# Patient Record
Sex: FEMALE | Race: BLACK OR AFRICAN AMERICAN | NOT HISPANIC OR LATINO | Employment: FULL TIME | URBAN - METROPOLITAN AREA
[De-identification: names, ages, dates, MRNs, and addresses within clinical notes are randomized per-mention and may not be internally consistent; named-entity substitution may affect disease eponyms.]

---

## 2019-05-11 PROBLEM — N83.209 OVARIAN CYST: Status: ACTIVE | Noted: 2019-05-11

## 2020-08-12 PROBLEM — Z01.419 ENCOUNTER FOR WELL WOMAN EXAM WITH ROUTINE GYNECOLOGICAL EXAM: Status: ACTIVE | Noted: 2020-08-12

## 2020-08-12 PROBLEM — Z11.3 SCREENING EXAMINATION FOR STD (SEXUALLY TRANSMITTED DISEASE): Status: ACTIVE | Noted: 2020-08-12

## 2021-03-30 PROBLEM — J34.89 CONCHA BULLOSA: Status: ACTIVE | Noted: 2021-03-30

## 2021-03-30 PROBLEM — J34.3 NASAL TURBINATE HYPERTROPHY: Status: ACTIVE | Noted: 2021-03-30

## 2021-04-09 PROBLEM — R09.81 NASAL CONGESTION: Status: ACTIVE | Noted: 2021-04-09

## 2021-05-12 ENCOUNTER — PATIENT MESSAGE (OUTPATIENT)
Dept: RESEARCH | Facility: HOSPITAL | Age: 26
End: 2021-05-12

## 2022-11-28 PROBLEM — R10.30 LOWER ABDOMINAL PAIN: Status: ACTIVE | Noted: 2022-11-28

## 2022-11-28 PROBLEM — R10.2 PELVIC AND PERINEAL PAIN: Status: ACTIVE | Noted: 2022-11-28

## 2023-01-27 PROBLEM — R87.610 ATYPICAL SQUAMOUS CELL CHANGES OF UNDETERMINED SIGNIFICANCE (ASCUS) ON CERVICAL CYTOLOGY WITH NEGATIVE HIGH RISK HUMAN PAPILLOMA VIRUS (HPV) TEST RESULT: Status: ACTIVE | Noted: 2023-01-27

## 2023-05-04 PROBLEM — Z01.818 PREOP EXAMINATION: Status: ACTIVE | Noted: 2023-05-04

## 2023-05-04 PROBLEM — Q52.4 GARTNER'S DUCT CYST: Status: ACTIVE | Noted: 2023-05-04

## 2023-05-10 PROBLEM — N80.9 ENDOMETRIOSIS DETERMINED BY LAPAROSCOPY: Status: ACTIVE | Noted: 2023-05-10

## 2023-05-10 PROBLEM — N94.6 DYSMENORRHEA: Status: ACTIVE | Noted: 2023-05-10

## 2023-05-10 PROBLEM — N89.8 VAGINAL WALL CYST: Status: ACTIVE | Noted: 2023-05-10

## 2023-05-10 PROBLEM — R10.2 PELVIC PAIN: Status: ACTIVE | Noted: 2023-05-10

## 2023-05-10 PROBLEM — Z98.890 STATUS POST LAPAROSCOPY: Status: ACTIVE | Noted: 2023-05-10

## 2023-06-20 ENCOUNTER — PATIENT MESSAGE (OUTPATIENT)
Dept: RESEARCH | Facility: HOSPITAL | Age: 28
End: 2023-06-20
Payer: MEDICAID

## 2023-06-27 ENCOUNTER — PATIENT MESSAGE (OUTPATIENT)
Dept: RESEARCH | Facility: HOSPITAL | Age: 28
End: 2023-06-27
Payer: MEDICAID

## 2023-07-05 ENCOUNTER — PATIENT MESSAGE (OUTPATIENT)
Dept: RESEARCH | Facility: HOSPITAL | Age: 28
End: 2023-07-05
Payer: MEDICAID

## 2023-07-11 ENCOUNTER — PATIENT MESSAGE (OUTPATIENT)
Dept: RESEARCH | Facility: HOSPITAL | Age: 28
End: 2023-07-11
Payer: MEDICAID

## 2023-10-06 ENCOUNTER — ON-DEMAND VIRTUAL (OUTPATIENT)
Dept: URGENT CARE | Facility: CLINIC | Age: 28
End: 2023-10-06
Payer: MEDICAID

## 2023-10-06 DIAGNOSIS — J02.9 ACUTE PHARYNGITIS, UNSPECIFIED ETIOLOGY: Primary | ICD-10-CM

## 2023-10-06 PROCEDURE — 99202 PR OFFICE/OUTPT VISIT, NEW, LEVL II, 15-29 MIN: ICD-10-PCS | Mod: 95,,, | Performed by: NURSE PRACTITIONER

## 2023-10-06 PROCEDURE — 99202 OFFICE O/P NEW SF 15 MIN: CPT | Mod: 95,,, | Performed by: NURSE PRACTITIONER

## 2023-10-06 RX ORDER — AMOXICILLIN 500 MG/1
500 CAPSULE ORAL EVERY 12 HOURS
Qty: 20 CAPSULE | Refills: 0 | Status: SHIPPED | OUTPATIENT
Start: 2023-10-06 | End: 2023-10-16

## 2023-10-06 NOTE — PROGRESS NOTES
Subjective:      Patient ID: Ethan Lares is a 27 y.o. female.    Vitals:  vitals were not taken for this visit.     Chief Complaint: Otalgia, Sinus Problem, and Sore Throat      Visit Type: TELE AUDIOVISUAL    Present with the patient at the time of consultation: TELEMED PRESENT WITH PATIENT: None    Past Medical History:   Diagnosis Date    Anxiety      Past Surgical History:   Procedure Laterality Date    ANTERIOR VAGINAL REPAIR N/A     s/p delivery with vaginal disruption    DIAGNOSTIC LAPAROSCOPY N/A 5/10/2023    Procedure: LAPAROSCOPY, DIAGNOSTIC;  Surgeon: Regla Larios MD;  Location: [facility] MAIN OR;  Service: OB/GYN;  Laterality: N/A;    NASAL TURBINATE REDUCTION Bilateral 4/9/2021    Procedure: REDUCTION, NASAL TURBINATE;  Surgeon: Sujit Thomas MD;  Location: [facility]\"" MAIN OR;  Service: ENT;  Laterality: Bilateral;    VAGINECTOMY N/A 5/10/2023    Procedure: VAGINECTOMY/vaginal cyst excision;  Surgeon: Regla Larios MD;  Location: [facility] MAIN OR;  Service: OB/GYN;  Laterality: N/A;     Review of patient's allergies indicates:   Allergen Reactions    Metronidazole Anxiety, Shortness Of Breath and Swelling    Shrimp Rash     Current Outpatient Medications on File Prior to Visit   Medication Sig Dispense Refill    ISOtretinoin (ACCUTANE) 40 MG capsule Take 1 capsule (40 mg total) by mouth once daily. 30 capsule 0    norgestimate-ethinyl estradioL (ORTHO-CYCLEN) 0.25-35 mg-mcg per tablet Take 1 tablet by mouth once daily. 28 tablet 11    [DISCONTINUED] elagolix (ORILISSA) 150 mg Tab Take 150 mg by mouth Daily. 30 tablet 2    [DISCONTINUED] ibuprofen (ADVIL,MOTRIN) 600 MG tablet Take 1 tablet (600 mg total) by mouth every 8 (eight) hours as needed for Pain. 30 tablet 2    [DISCONTINUED] triamcinolone acetonide 0.1% (KENALOG) 0.1 % cream Apply topically 2 (two) times daily. For two only. (Patient not taking: Reported on 9/18/2023) 28.4 g 1     No current facility-administered  medications on file prior to visit.     Family History   Problem Relation Age of Onset    No Known Problems Mother     No Known Problems Father        Medications Ordered                [id number] - [place], [place] - [address] AT SEC OF [facility] [address] &   [address], [place] [address]    Telephone: [phone]   Fax: [phone]   Hours: Open 24 hours                         E-Prescribed (1 of 1)              amoxicillin (AMOXIL) 500 MG capsule    Sig: Take 1 capsule (500 mg total) by mouth every 12 (twelve) hours. for 10 days       Start: 10/6/23     Quantity: 20 capsule Refills: 0                           Ohs Peq Odvv Intake    10/6/2023  8:53 AM CDT - Filed by Patient   Describe your reason for todays visit Ear ache , sore throat , congestion   What is your current physical address in the event of a medical emergency? [address]   Are you able to take your vital signs? No   Please attach any relevant images or files          Onset 3 days. Reports fever, left otalgia, congestion, sore throat. +sick contacts and strep exposure.    Otalgia   Associated symptoms include a sore throat. Pertinent negatives include no abdominal pain, coughing, diarrhea or vomiting.   Sinus Problem  Associated symptoms include chills, congestion, ear pain and a sore throat. Pertinent negatives include no coughing.   Sore Throat   Associated symptoms include congestion and ear pain. Pertinent negatives include no abdominal pain, coughing, diarrhea or vomiting.       Constitution: Positive for chills and fever (102).   HENT:  Positive for ear pain, congestion and sore throat.    Neck: Negative for painful lymph nodes.   Respiratory:  Negative for cough.    Gastrointestinal:  Negative for abdominal pain, nausea, vomiting and diarrhea.   Hematologic/Lymphatic: Negative for swollen lymph nodes.        Objective:   The physical exam was conducted virtually.  Physical Exam    Constitutional: She is oriented to person, place, and time. She does not appear ill. No distress.   HENT:   Head: Normocephalic and atraumatic.   Nose: Nose normal.   Eyes: Extraocular movement intact   Pulmonary/Chest: Effort normal.   Abdominal: Normal appearance.   Musculoskeletal: Normal range of motion.         General: Normal range of motion.   Neurological: no focal deficit. She is alert and oriented to person, place, and time.   Psychiatric: Her behavior is normal. Mood normal.   Vitals reviewed.      Assessment:     1. Acute pharyngitis, unspecified etiology        Plan:     Patient encouraged to monitor symptoms closely and instructed to follow-up for new or worsening symptoms. Further, in-person, evaluation may be necessary for continued treatment. Please follow up with your primary care doctor or specialist as needed. Verbally discussed plan. Patient confirms understanding and is in agreement with treatment and plan.     You must understand that you've received a Virtual Care evaluation only and that you may be released before all your medical problems are known or treated. You, the patient, will arrange for follow up care as instructed.    Acute pharyngitis, unspecified etiology  -     amoxicillin (AMOXIL) 500 MG capsule; Take 1 capsule (500 mg total) by mouth every 12 (twelve) hours. for 10 days  Dispense: 20 capsule; Refill: 0        Patient Instructions   OVER THE COUNTER RECOMMENDATIONS/SUGGESTIONS (IF NO CONTRAINDICATIONS).     ·         Make sure to stay well hydrated.     ·         Use Nasal Saline to mechanically move any post nasal drip from your eustachian tube or from the back of your throat.     ·         Use warm saltwater gargles to ease your throat pain. Warm saltwater gargles as needed for sore throat-  1/2 tsp salt to 1 cup warm water, gargle as desired. Warm fluids tend to relieve a sore throat.     .         Throat lozenges, Chloraseptic spray or other over the counter treatments  are ok to use as well. Use as directed.     ·         Use an antihistamine such as Claritin, Zyrtec or Allegra to dry you out.     ·         Use pseudoephedrine (behind the counter) to decongest. Pseudoephedrine  30 mg up to 240 mg /day. It can raise your blood pressure and give you palpitations.     ·         Use Mucinex (guaifenesin) to break up mucous up to 2400mg/day to loosen any mucous.     ·         The Mucinex DM pill has a cough suppressant that can be sedating. It can be used at night to stop the tickle at the back of your throat.     ·         You can use Mucinex D (it has guaifenesin and a high dose of pseudoephedrine) in the mornings to help decongest.     ·         Use Afrin (oxymetazoline) in each nare for no longer than 3 days, as it is addictive. It can also dry out your mucous membranes and cause elevated blood pressure. This is especially useful if you are flying.     ·         Use Flonase 1-2 sprays/nostril per day. It is a local acting steroid nasal spray, if you develop a bloody nose, stop using the medication immediately.     ·         Sometimes Nyquil at night is beneficial to help you get some rest, however it is sedating, and it does have an antihistamine, and Tylenol.     ·         Honey is a natural cough suppressant that can be used.     ·         Tylenol up to 4,000 mg a day is safe for short periods and can be used for body aches, pain, and fever. However, in high doses and prolonged use it can cause liver irritation.     ·         Ibuprofen is a non-steroidal anti-inflammatory that can be used for body aches, pain, and fever. However, it can also cause stomach irritation if overused.        Sore Throat Discharge Instructions, Adult   About this topic   Swelling at the back of your throat is called pharyngitis. Swelling of your throat and tonsils is tonsillopharyngitis. Both are commonly called a sore throat. Your sore throat is likely caused by a virus. Most of the time, a sore throat  will go away without antibiotics in a week or two.           What care is needed at home?   Ask your doctor what you need to do when you go home. Make sure you ask questions if you do not understand what the doctor says. This way you will know what you need to do.  To help ease a sore throat you can:  Use a sore throat spray.  Suck on hard candy or throat lozenges.  Gargle with warm saltwater a few times each day. Mix of 1/4 teaspoon (1.25 grams) salt in 8 ounces (240 mL) of warm water.  Use a cool mist humidifier to help you breathe easier.  You may want to take medicine like acetaminophen, ibuprofen, or naproxen for pain.  If you decide to take over-the-counter cough or cold medicines, follow the directions on the label carefully. Be sure you do not take more than one medicine that contains acetaminophen. Also, if you have a heart problem or high blood pressure, check with your doctor before you take any of these medicines.  Wash your hands often. This will help keep others healthy.  What follow-up care is needed?   Your doctor may ask you to make visits to the office to check on your progress. Be sure to keep these visits.  What drugs may be needed?   Take your drugs as ordered by your doctor. Do not skip doses or stop when you feel better. The doctor may order drugs to:  Prevent or fight an infection  Help with pain  Lower fever  Help nasal congestion and runny nose  Soothe the throat  Will physical activity be limited?   You may need to rest at home for 1 to 2 days or until you are feeling well.  What changes to diet are needed?   If your throat feels too sore to eat solid foods you may drink juice, milk, milkshakes, or soups. Talk to your doctor about what diet is proper for your condition.  What can be done to prevent this health problem?   Wash your hands often. Be sure to wash after you blow your nose or take care of others with a sore throat.  Do not share utensils and drinking glasses with someone who has  a sore throat. Wash these objects with hot, soapy water.  Do not share your foods or drinks with others while you are sick. You might infect them.  Throw away used tissues right away and then wash your hands.  Get a new toothbrush after signs are gone or you are done with your antibiotics.  When do I need to call the doctor?   You have trouble breathing.  Your neck, tongue, or throat is swelling.  You are drooling because you cannot swallow your saliva.  You have very bad pain in your throat that keeps you from eating or drinking anything.  There are large, painful lumps in your neck.  You have blisters in the back of your throat.  Teach Back: Helping You Understand   The Teach Back Method helps you understand the information we are giving you. After you talk with the staff, tell them in your own words what you learned. This helps to make sure the staff has described each thing clearly. It also helps to explain things that may have been confusing. Before going home, make sure you can do these:  I can tell you about my condition.  I can tell you what may help ease my pain.  I can tell you what I will do if I have trouble breathing or swallowing or have large painful lumps in my throat.  Where can I learn more?   Centers for Disease Control and Prevention  https://www.cdc.gov/antibiotic-use/community/for-patients/common-illnesses/sore-throat.html   [facility]  https://www.health.govt.nz/your-health/conditions-and-treatments/diseases-and-illnesses/sore-throat   NHS Choices  https://www.nhs.uk/conditions/sore-throat/   Last Reviewed Date   2021-06-08  Consumer Information Use and Disclaimer   This information is not specific medical advice and does not replace information you receive from your health care provider. This is only a brief summary of general information. It does NOT include all information about conditions, illnesses, injuries, tests, procedures, treatments, therapies, discharge instructions or  life-style choices that may apply to you. You must talk with your health care provider for complete information about your health and treatment options. This information should not be used to decide whether or not to accept your health care providers advice, instructions or recommendations. Only your health care provider has the knowledge and training to provide advice that is right for you.  Copyright   Copyright © 2021 [employer] and its affiliates and/or licensors. All rights reserved.

## 2023-10-06 NOTE — PATIENT INSTRUCTIONS
OVER THE COUNTER RECOMMENDATIONS/SUGGESTIONS (IF NO CONTRAINDICATIONS).     ·         Make sure to stay well hydrated.     ·         Use Nasal Saline to mechanically move any post nasal drip from your eustachian tube or from the back of your throat.     ·         Use warm saltwater gargles to ease your throat pain. Warm saltwater gargles as needed for sore throat-  1/2 tsp salt to 1 cup warm water, gargle as desired. Warm fluids tend to relieve a sore throat.     .         Throat lozenges, Chloraseptic spray or other over the counter treatments are ok to use as well. Use as directed.     ·         Use an antihistamine such as Claritin, Zyrtec or Allegra to dry you out.     ·         Use pseudoephedrine (behind the counter) to decongest. Pseudoephedrine  30 mg up to 240 mg /day. It can raise your blood pressure and give you palpitations.     ·         Use Mucinex (guaifenesin) to break up mucous up to 2400mg/day to loosen any mucous.     ·         The Mucinex DM pill has a cough suppressant that can be sedating. It can be used at night to stop the tickle at the back of your throat.     ·         You can use Mucinex D (it has guaifenesin and a high dose of pseudoephedrine) in the mornings to help decongest.     ·         Use Afrin (oxymetazoline) in each nare for no longer than 3 days, as it is addictive. It can also dry out your mucous membranes and cause elevated blood pressure. This is especially useful if you are flying.     ·         Use Flonase 1-2 sprays/nostril per day. It is a local acting steroid nasal spray, if you develop a bloody nose, stop using the medication immediately.     ·         Sometimes Nyquil at night is beneficial to help you get some rest, however it is sedating, and it does have an antihistamine, and Tylenol.     ·         Honey is a natural cough suppressant that can be used.     ·         Tylenol up to 4,000 mg a day is safe for short periods and can be used for body aches, pain, and  fever. However, in high doses and prolonged use it can cause liver irritation.     ·         Ibuprofen is a non-steroidal anti-inflammatory that can be used for body aches, pain, and fever. However, it can also cause stomach irritation if overused.        Sore Throat Discharge Instructions, Adult   About this topic   Swelling at the back of your throat is called pharyngitis. Swelling of your throat and tonsils is tonsillopharyngitis. Both are commonly called a sore throat. Your sore throat is likely caused by a virus. Most of the time, a sore throat will go away without antibiotics in a week or two.           What care is needed at home?   Ask your doctor what you need to do when you go home. Make sure you ask questions if you do not understand what the doctor says. This way you will know what you need to do.  To help ease a sore throat you can:  Use a sore throat spray.  Suck on hard candy or throat lozenges.  Gargle with warm saltwater a few times each day. Mix of 1/4 teaspoon (1.25 grams) salt in 8 ounces (240 mL) of warm water.  Use a cool mist humidifier to help you breathe easier.  You may want to take medicine like acetaminophen, ibuprofen, or naproxen for pain.  If you decide to take over-the-counter cough or cold medicines, follow the directions on the label carefully. Be sure you do not take more than one medicine that contains acetaminophen. Also, if you have a heart problem or high blood pressure, check with your doctor before you take any of these medicines.  Wash your hands often. This will help keep others healthy.  What follow-up care is needed?   Your doctor may ask you to make visits to the office to check on your progress. Be sure to keep these visits.  What drugs may be needed?   Take your drugs as ordered by your doctor. Do not skip doses or stop when you feel better. The doctor may order drugs to:  Prevent or fight an infection  Help with pain  Lower fever  Help nasal congestion and runny  nose  Soothe the throat  Will physical activity be limited?   You may need to rest at home for 1 to 2 days or until you are feeling well.  What changes to diet are needed?   If your throat feels too sore to eat solid foods you may drink juice, milk, milkshakes, or soups. Talk to your doctor about what diet is proper for your condition.  What can be done to prevent this health problem?   Wash your hands often. Be sure to wash after you blow your nose or take care of others with a sore throat.  Do not share utensils and drinking glasses with someone who has a sore throat. Wash these objects with hot, soapy water.  Do not share your foods or drinks with others while you are sick. You might infect them.  Throw away used tissues right away and then wash your hands.  Get a new toothbrush after signs are gone or you are done with your antibiotics.  When do I need to call the doctor?   You have trouble breathing.  Your neck, tongue, or throat is swelling.  You are drooling because you cannot swallow your saliva.  You have very bad pain in your throat that keeps you from eating or drinking anything.  There are large, painful lumps in your neck.  You have blisters in the back of your throat.  Teach Back: Helping You Understand   The Teach Back Method helps you understand the information we are giving you. After you talk with the staff, tell them in your own words what you learned. This helps to make sure the staff has described each thing clearly. It also helps to explain things that may have been confusing. Before going home, make sure you can do these:  I can tell you about my condition.  I can tell you what may help ease my pain.  I can tell you what I will do if I have trouble breathing or swallowing or have large painful lumps in my throat.  Where can I learn more?   Centers for Disease Control and Prevention  https://www.cdc.gov/antibiotic-use/community/for-patients/common-illnesses/sore-throat.html   [facility]  Health  https://www.health.govt.nz/your-health/conditions-and-treatments/diseases-and-illnesses/sore-throat   NHS Choices  https://www.nhs.uk/conditions/sore-throat/   Last Reviewed Date   2021-06-08  Consumer Information Use and Disclaimer   This information is not specific medical advice and does not replace information you receive from your health care provider. This is only a brief summary of general information. It does NOT include all information about conditions, illnesses, injuries, tests, procedures, treatments, therapies, discharge instructions or life-style choices that may apply to you. You must talk with your health care provider for complete information about your health and treatment options. This information should not be used to decide whether or not to accept your health care providers advice, instructions or recommendations. Only your health care provider has the knowledge and training to provide advice that is right for you.  Copyright   Copyright © 2021 [employer] and its affiliates and/or licensors. All rights reserved.

## 2023-10-31 ENCOUNTER — ON-DEMAND VIRTUAL (OUTPATIENT)
Dept: URGENT CARE | Facility: CLINIC | Age: 28
End: 2023-10-31
Payer: MEDICAID

## 2023-10-31 DIAGNOSIS — B37.31 YEAST VAGINITIS: Primary | ICD-10-CM

## 2023-10-31 PROCEDURE — 99213 OFFICE O/P EST LOW 20 MIN: CPT | Mod: 95,,, | Performed by: FAMILY MEDICINE

## 2023-10-31 PROCEDURE — 99213 PR OFFICE/OUTPT VISIT, EST, LEVL III, 20-29 MIN: ICD-10-PCS | Mod: 95,,, | Performed by: FAMILY MEDICINE

## 2023-10-31 RX ORDER — FLUCONAZOLE 150 MG/1
TABLET ORAL
Qty: 2 TABLET | Refills: 0 | Status: SHIPPED | OUTPATIENT
Start: 2023-10-31 | End: 2023-12-21

## 2023-10-31 NOTE — PROGRESS NOTES
Subjective:      Patient ID: Ethan Lares is a 27 y.o. female.    Vitals:  vitals were not taken for this visit.     Chief Complaint: Vaginitis      Visit Type: TELE AUDIOVISUAL    Present with the patient at the time of consultation: TELEMED PRESENT WITH PATIENT: None    Past Medical History:   Diagnosis Date    Anxiety      Past Surgical History:   Procedure Laterality Date    ANTERIOR VAGINAL REPAIR N/A     s/p delivery with vaginal disruption    DIAGNOSTIC LAPAROSCOPY N/A 5/10/2023    Procedure: LAPAROSCOPY, DIAGNOSTIC;  Surgeon: Regla Larios MD;  Location: [facility] MAIN OR;  Service: OB/GYN;  Laterality: N/A;    NASAL TURBINATE REDUCTION Bilateral 4/9/2021    Procedure: REDUCTION, NASAL TURBINATE;  Surgeon: Sujit Thomas MD;  Location: [facility] MAIN OR;  Service: ENT;  Laterality: Bilateral;    VAGINECTOMY N/A 5/10/2023    Procedure: VAGINECTOMY/vaginal cyst excision;  Surgeon: Regla Larios MD;  Location: [facility] MAIN OR;  Service: OB/GYN;  Laterality: N/A;     Review of patient's allergies indicates:   Allergen Reactions    Metronidazole Anxiety, Shortness Of Breath and Swelling    Shrimp Rash     Current Outpatient Medications on File Prior to Visit   Medication Sig Dispense Refill    ISOtretinoin (ACCUTANE) 40 MG capsule Take 1 capsule (40 mg total) by mouth once daily. 30 capsule 0    norgestimate-ethinyl estradioL (ORTHO-CYCLEN) 0.25-35 mg-mcg per tablet Take 1 tablet by mouth once daily. 28 tablet 11     No current facility-administered medications on file prior to visit.     Family History   Problem Relation Age of Onset    No Known Problems Mother     No Known Problems Father        Medications Ordered                [id number] [place] [place] [facility] [address] AT [facility]   [address], [place] [address]    Telephone: [phone]   Fax: [phone]   Hours: Not open 24 hours                          E-Prescribed (1 of 1)              fluconazole (DIFLUCAN) 150 MG Tab    Sig: One by mouth as a single dose.  Repeat in 72 hours if still with symptoms.       Start: 10/31/23     Quantity: 2 tablet Refills: 0                           Ohs Peq Odvv Intake    10/31/2023  4:00 AM CDT - Filed by Patient   Describe your reason for todays visit Yeast infection   What is your current physical address in the event of a medical emergency? [address]   Are you able to take your vital signs? No   Please attach any relevant images or files          27-year-old female with 3rd day of what she thinks is a yeast infection.  She reports thick whitish discharge and vaginal itching.  No malodor.  No pelvic pain.  No fever.  She denies concern for STD.  Monogamous for 1 year.        Constitution: Negative for fever.   Genitourinary:  Positive for vaginal discharge. Negative for pelvic pain.        Objective:   The physical exam was conducted virtually.  Physical Exam   Constitutional: She is oriented to person, place, and time. She appears well-developed.  Non-toxic appearance. She does not appear ill. No distress.   HENT:   Head: Normocephalic and atraumatic.   Ears:   Right Ear: External ear normal.   Left Ear: External ear normal.   Pulmonary/Chest: Effort normal. No stridor. No respiratory distress.   Abdominal: There is no abdominal tenderness.   Neurological: She is alert and oriented to person, place, and time.   Skin: Skin is not diaphoretic.   Psychiatric: Her behavior is normal. Thought content normal.   Nursing note and vitals reviewed.      Assessment:     1. Yeast vaginitis        Plan:       Yeast vaginitis  -     fluconazole (DIFLUCAN) 150 MG Tab; One by mouth as a single dose.  Repeat in 72 hours if still with symptoms.  Dispense: 2 tablet; Refill: 0    As we discussed, if this does not improve/resolve you will need to be seen in person.  Or in the event of pelvic pain, fever etc..    Make sure that you  follow up with your primary care doctor in the next 2-5 days if needed .  Go to or return to Urgent Care if signs or symptoms change and certainly if you have worsening and/or severe symptoms go to the nearest emergency department for further evaluation.

## 2023-10-31 NOTE — PATIENT INSTRUCTIONS
As we discussed, if this does not improve/resolve you will need to be seen in person.  Or in the event of pelvic pain, fever etc..    Make sure that you follow up with your primary care doctor in the next 2-5 days if needed .  Go to or return to Urgent Care if signs or symptoms change and certainly if you have worsening and/or severe symptoms go to the nearest emergency department for further evaluation.

## 2023-12-21 ENCOUNTER — ON-DEMAND VIRTUAL (OUTPATIENT)
Dept: URGENT CARE | Facility: CLINIC | Age: 28
End: 2023-12-21
Payer: MEDICAID

## 2023-12-21 DIAGNOSIS — N89.8 VAGINAL DISCHARGE: Primary | ICD-10-CM

## 2023-12-21 PROCEDURE — 99212 PR OFFICE/OUTPT VISIT, EST, LEVL II, 10-19 MIN: ICD-10-PCS | Mod: 95,,, | Performed by: NURSE PRACTITIONER

## 2023-12-21 PROCEDURE — 99212 OFFICE O/P EST SF 10 MIN: CPT | Mod: 95,,, | Performed by: NURSE PRACTITIONER

## 2023-12-21 RX ORDER — FLUCONAZOLE 150 MG/1
150 TABLET ORAL DAILY
Qty: 2 TABLET | Refills: 0 | Status: SHIPPED | OUTPATIENT
Start: 2023-12-21 | End: 2023-12-23

## 2023-12-21 RX ORDER — CLINDAMYCIN HYDROCHLORIDE 300 MG/1
300 CAPSULE ORAL EVERY 12 HOURS
Qty: 14 CAPSULE | Refills: 0 | Status: SHIPPED | OUTPATIENT
Start: 2023-12-21 | End: 2023-12-28

## 2023-12-21 NOTE — PROGRESS NOTES
Subjective:      Patient ID: Ethan Lares is a 28 y.o. female.    Vitals:  vitals were not taken for this visit.     Chief Complaint: Vaginal Discharge      Visit Type: TELE AUDIOVISUAL    Present with the patient at the time of consultation: TELEMED PRESENT WITH PATIENT: None    Past Medical History:   Diagnosis Date    Anxiety      Past Surgical History:   Procedure Laterality Date    ANTERIOR VAGINAL REPAIR N/A     s/p delivery with vaginal disruption    DIAGNOSTIC LAPAROSCOPY N/A 5/10/2023    Procedure: LAPAROSCOPY, DIAGNOSTIC;  Surgeon: Regla Larios MD;  Location: [facility] MAIN OR;  Service: OB/GYN;  Laterality: N/A;    NASAL TURBINATE REDUCTION Bilateral 4/9/2021    Procedure: REDUCTION, NASAL TURBINATE;  Surgeon: Sujit Thomas MD;  Location: [facility] MAIN OR;  Service: ENT;  Laterality: Bilateral;    VAGINECTOMY N/A 5/10/2023    Procedure: VAGINECTOMY/vaginal cyst excision;  Surgeon: Regla Larios MD;  Location: [facility] MAIN OR;  Service: OB/GYN;  Laterality: N/A;     Review of patient's allergies indicates:   Allergen Reactions    Metronidazole Anxiety, Shortness Of Breath and Swelling    Shrimp Rash     Current Outpatient Medications on File Prior to Visit   Medication Sig Dispense Refill    ISOtretinoin (ACCUTANE) 40 MG capsule Take 1 capsule (40 mg total) by mouth once daily. 30 capsule 0    norgestimate-ethinyl estradioL (ORTHO-CYCLEN) 0.25-35 mg-mcg per tablet Take 1 tablet by mouth once daily. 28 tablet 11    [DISCONTINUED] fluconazole (DIFLUCAN) 150 MG Tab One by mouth as a single dose.  Repeat in 72 hours if still with symptoms. 2 tablet 0     No current facility-administered medications on file prior to visit.     Family History   Problem Relation Age of Onset    No Known Problems Mother     No Known Problems Father        Medications Ordered                [facility] #[id number] - [address] AT [place] [address]  [place] [address]    Telephone: [phone]   Fax: [phone]   Hours: Not open 24 hours                         E-Prescribed (2 of 2)              clindamycin (CLEOCIN) 300 MG capsule    Sig: Take 1 capsule (300 mg total) by mouth every 12 (twelve) hours. for 7 days       Start: 12/21/23     Quantity: 14 capsule Refills: 0                         fluconazole (DIFLUCAN) 150 MG Tab    Sig: Take 1 tablet (150 mg total) by mouth once daily. Take 1 tablet as a single dose. If symptoms persist, may repeat a second dose in 72 hours. for 2 days       Start: 12/21/23     Quantity: 2 tablet Refills: 0                           Ohs Peq Odvv Intake    12/21/2023  9:07 AM CST - Filed by Patient   Describe your reason for todays visit BV and yeast symptoms   What is your current physical address in the event of a medical emergency? [address]   Are you able to take your vital signs? No   Please attach any relevant images or files          Hx of BV and yeast infections. Reports vaginal discharge and odor. No dysuria. No other associated symptoms to report. Allergy to flagyl, prefers oral clindamycin for tx.    Vaginal Discharge  The patient's primary symptoms include vaginal discharge. The patient's pertinent negatives include no pelvic pain. Pertinent negatives include no abdominal pain, back pain, chills, dysuria, fever or flank pain.       Constitution: Negative for chills and fever.   Gastrointestinal:  Negative for abdominal pain.   Genitourinary:  Positive for vaginal discharge and vaginal odor. Negative for dysuria, flank pain and pelvic pain.   Musculoskeletal:  Negative for back pain.        Objective:   The physical exam was conducted virtually.  Physical Exam   Constitutional: She is oriented to person, place, and time. She does not appear ill. No distress.   HENT:   Head: Normocephalic and atraumatic.   Nose: Nose normal.   Eyes: Extraocular movement intact   Pulmonary/Chest:  Effort normal.   Abdominal: Normal appearance.   Musculoskeletal: Normal range of motion.         General: Normal range of motion.   Neurological: no focal deficit. She is alert and oriented to person, place, and time.   Psychiatric: Her behavior is normal. Mood normal.   Vitals reviewed.      Assessment:     1. Vaginal discharge        Plan:   Patient encouraged to monitor symptoms closely and instructed to follow-up for new or worsening symptoms. Further, in-person, evaluation may be necessary for continued treatment. Please follow up with your primary care doctor or specialist as needed. Verbally discussed plan. Patient confirms understanding and is in agreement with treatment and plan.     You must understand that you've received a Virtual Care evaluation only and that you may be released before all your medical problems are known or treated. You, the patient, will arrange for follow up care as instructed.      Vaginal discharge  -     clindamycin (CLEOCIN) 300 MG capsule; Take 1 capsule (300 mg total) by mouth every 12 (twelve) hours. for 7 days  Dispense: 14 capsule; Refill: 0  -     fluconazole (DIFLUCAN) 150 MG Tab; Take 1 tablet (150 mg total) by mouth once daily. Take 1 tablet as a single dose. If symptoms persist, may repeat a second dose in 72 hours. for 2 days  Dispense: 2 tablet; Refill: 0

## 2024-01-22 ENCOUNTER — ON-DEMAND VIRTUAL (OUTPATIENT)
Dept: URGENT CARE | Facility: CLINIC | Age: 29
End: 2024-01-22
Payer: MEDICAID

## 2024-01-22 DIAGNOSIS — N76.0 ACUTE VAGINITIS: Primary | ICD-10-CM

## 2024-01-22 PROCEDURE — 99213 OFFICE O/P EST LOW 20 MIN: CPT | Mod: 95,,, | Performed by: PHYSICIAN ASSISTANT

## 2024-01-22 RX ORDER — CLINDAMYCIN HYDROCHLORIDE 300 MG/1
300 CAPSULE ORAL 2 TIMES DAILY
Qty: 14 CAPSULE | Refills: 0 | Status: SHIPPED | OUTPATIENT
Start: 2024-01-22 | End: 2024-01-29

## 2024-01-23 NOTE — PATIENT INSTRUCTIONS
1. Prescription has been sent to your pharmacy, please take as directed.   2. Wear loose fitting clothes and avoid touching/itching area. Keep area clean and dry.   3. Recheck in 4-5 days at local urgent care if no improvement sooner if worsening pain, fevers, vomiting. Keep appointment with OBGYN to discuss recurrence of these infections.   4. You must understand that you've received a Telehealth Urgent Care treatment only and that you may be released before all your medical problems are known or treated. You, the patient, will arrange for follow up care as instructed.

## 2024-01-23 NOTE — PROGRESS NOTES
Subjective:      Patient ID: Ethan Lares is a 28 y.o. female.    Vitals:  vitals were not taken for this visit.     Chief Complaint: Female  Problem      Visit Type: TELE AUDIOVISUAL    Present with the patient at the time of consultation: TELEMED PRESENT WITH PATIENT: None    Past Medical History:   Diagnosis Date    Anxiety      Past Surgical History:   Procedure Laterality Date    ANTERIOR VAGINAL REPAIR N/A     s/p delivery with vaginal disruption    DIAGNOSTIC LAPAROSCOPY N/A 5/10/2023    Procedure: LAPAROSCOPY, DIAGNOSTIC;  Surgeon: Regla Larios MD;  Location: [facility] MAIN OR;  Service: OB/GYN;  Laterality: N/A;    NASAL TURBINATE REDUCTION Bilateral 4/9/2021    Procedure: REDUCTION, NASAL TURBINATE;  Surgeon: Sujit Thomas MD;  [facility];  Service: ENT;  Laterality: Bilateral;    VAGINECTOMY N/A 5/10/2023    Procedure: VAGINECTOMY/vaginal cyst excision;  Surgeon: Regla Larios MD;  Location: [facility] MAIN OR;  Service: OB/GYN;  Laterality: N/A;     Review of patient's allergies indicates:   Allergen Reactions    Metronidazole Anxiety, Shortness Of Breath and Swelling    Shrimp Rash     Current Outpatient Medications on File Prior to Visit   Medication Sig Dispense Refill    ISOtretinoin (ACCUTANE) 40 MG capsule Take 1 capsule (40 mg total) by mouth once daily. 30 capsule 0    norgestimate-ethinyl estradioL (ORTHO-CYCLEN) 0.25-35 mg-mcg per tablet Take 1 tablet by mouth once daily. 28 tablet 11     No current facility-administered medications on file prior to visit.     Family History   Problem Relation Age of Onset    No Known Problems Mother     No Known Problems Father        Medications Ordered                [id number] [place] [place] [facility] [address] AT [facility]   [address], [place] [address]    Telephone: [phone]   Fax: [phone]   Hours: Not open 24 hours                          E-Prescribed (1 of 1)              clindamycin (CLEOCIN) 300 MG capsule    Sig: Take 1 capsule (300 mg total) by mouth 2 (two) times daily. for 7 days       Start: 1/22/24     Quantity: 14 capsule Refills: 0                           Ohs Peq Odvv Intake    1/22/2024  8:27 PM CST - Filed by Patient   What is your current physical address in the event of a medical emergency? [address]   Are you able to take your vital signs? No   Please attach any relevant images or files          HPI  27yo female presents with c/o vaginal fishy order x two days ago. Denies fevers, chills, n/v, pelvic pain, vaginal discharge, urinary symptoms.     Hx of BV, recurrent every few months. Also has endometriosis. Unable to see OBGYN until next week.    Had annual with OBGYN last week, all normal then. Denies POP, STD risk.          Constitution: Negative for chills and fever.   Gastrointestinal:  Negative for abdominal pain, nausea and vomiting.   Genitourinary:  Positive for vaginal odor. Negative for dysuria, frequency, urgency, hematuria, vaginal discharge, vaginal bleeding, genital sore and pelvic pain.        Objective:   The physical exam was conducted virtually.  Physical Exam   Constitutional: She is oriented to person, place, and time.  Non-toxic appearance. She does not appear ill. No distress.   HENT:   Head: Normocephalic and atraumatic.   Pulmonary/Chest: Effort normal. No respiratory distress. She has no wheezes.   Abdominal: Normal appearance. She exhibits no distension. Soft. There is no abdominal tenderness.   Neurological: She is alert and oriented to person, place, and time. Coordination normal.   Skin: Skin is not diaphoretic, not pale and no rash.   Psychiatric: Her behavior is normal. Judgment and thought content normal.       Assessment:     1. Acute vaginitis        Plan:       Acute vaginitis    Other orders  -     clindamycin (CLEOCIN) 300 MG capsule; Take 1 capsule (300 mg total) by mouth 2 (two)  times daily. for 7 days  Dispense: 14 capsule; Refill: 0    1. Prescription has been sent to your pharmacy, please take as directed.   2. Wear loose fitting clothes and avoid touching/itching area. Keep area clean and dry.   3. Recheck in 4-5 days at local urgent care if no improvement sooner if worsening pain, fevers, vomiting. Keep appointment with OBGYN to discuss recurrence of these infections.   4. You must understand that you've received a Telehealth Urgent Care treatment only and that you may be released before all your medical problems are known or treated. You, the patient, will arrange for follow up care as instructed.     Verbally discussed plan and patient confirms understanding.

## 2024-02-21 ENCOUNTER — ON-DEMAND VIRTUAL (OUTPATIENT)
Dept: URGENT CARE | Facility: CLINIC | Age: 29
End: 2024-02-21
Payer: MEDICAID

## 2024-02-21 DIAGNOSIS — N89.8 VAGINAL DISCHARGE: Primary | ICD-10-CM

## 2024-02-21 PROCEDURE — 99212 OFFICE O/P EST SF 10 MIN: CPT | Mod: 95,,, | Performed by: NURSE PRACTITIONER

## 2024-02-21 RX ORDER — CLINDAMYCIN HYDROCHLORIDE 300 MG/1
300 CAPSULE ORAL EVERY 12 HOURS
Qty: 14 CAPSULE | Refills: 0 | Status: SHIPPED | OUTPATIENT
Start: 2024-02-21 | End: 2024-02-28

## 2024-02-21 NOTE — PROGRESS NOTES
Subjective:      Patient ID: Ethan Lares is a 28 y.o. female.    Vitals:  vitals were not taken for this visit.     Chief Complaint: Vaginal Discharge      Visit Type: TELE AUDIOVISUAL    Present with the patient at the time of consultation: TELEMED PRESENT WITH PATIENT: None    Past Medical History:   Diagnosis Date    Anxiety      Past Surgical History:   Procedure Laterality Date    ANTERIOR VAGINAL REPAIR N/A     s/p delivery with vaginal disruption    DIAGNOSTIC LAPAROSCOPY N/A 5/10/2023    Procedure: LAPAROSCOPY, DIAGNOSTIC;  Surgeon: Regla Larios MD;  Location: [facility] MAIN OR;  Service: OB/GYN;  Laterality: N/A;    NASAL TURBINATE REDUCTION Bilateral 4/9/2021    Procedure: REDUCTION, NASAL TURBINATE;  Surgeon: Sujit Thomas MD;  Location: [facility] OR;  Service: ENT;  Laterality: Bilateral;    VAGINECTOMY N/A 5/10/2023    Procedure: VAGINECTOMY/vaginal cyst excision;  Surgeon: Regla Larios MD;  Location: [facility] MAIN OR;  Service: OB/GYN;  Laterality: N/A;     Review of patient's allergies indicates:   Allergen Reactions    Metronidazole Anxiety, Shortness Of Breath and Swelling    Shrimp Rash     Current Outpatient Medications on File Prior to Visit   Medication Sig Dispense Refill    [DISCONTINUED] ISOtretinoin (ACCUTANE) 40 MG capsule Take 1 capsule (40 mg total) by mouth once daily. 30 capsule 0     No current facility-administered medications on file prior to visit.     Family History   Problem Relation Age of Onset    No Known Problems Mother     No Known Problems Father        Medications Ordered                [facility] #[id number] [address] AT [facility]   [address] [facility] [address]    Telephone: [phone]   Fax: [phone]   Hours: Not open 24 hours                         E-Prescribed (1 of 1)              clindamycin (CLEOCIN) 300 MG capsule    Sig: Take 1 capsule (300 mg total)  by mouth every 12 (twelve) hours. for 7 days       Start: 2/21/24     Quantity: 14 capsule Refills: 0                           Ohs Peq Odvv Intake    2/21/2024 10:56 AM CST - Filed by Patient   What is your current physical address in the event of a medical emergency? [address] [facility] [address]   Are you able to take your vital signs? No   Please attach any relevant images or files          Hx of bacterial vaginosis. Fishy odor and discharge. No itching, rash or dysuria. Seeking Clindamycin for treatment.        Genitourinary:  Positive for vaginal discharge and vaginal odor. Negative for dysuria, hematuria, vaginal pain and pelvic pain.   Skin:  Negative for rash.   Allergic/Immunologic: Negative for itching.        Objective:   The physical exam was conducted virtually.  Physical Exam   Constitutional: She is oriented to person, place, and time. She does not appear ill. No distress.   HENT:   Head: Normocephalic and atraumatic.   Nose: Nose normal.   Eyes: Extraocular movement intact   Pulmonary/Chest: Effort normal.   Abdominal: Normal appearance.   Musculoskeletal: Normal range of motion.         General: Normal range of motion.   Neurological: no focal deficit. She is alert and oriented to person, place, and time.   Psychiatric: Her behavior is normal. Mood normal.   Vitals reviewed.      Assessment:     1. Vaginal discharge        Plan:   Patient encouraged to monitor symptoms closely and instructed to follow-up for new or worsening symptoms. Further, in-person, evaluation may be necessary for continued treatment. Please follow up with your primary care doctor or specialist as needed. Verbally discussed plan. Patient confirms understanding and is in agreement with treatment and plan.     You must understand that you've received a Virtual Care evaluation only and that you may be released before all your medical problems are known or treated. You, the patient, will arrange for follow up care as instructed.       Vaginal discharge  -     clindamycin (CLEOCIN) 300 MG capsule; Take 1 capsule (300 mg total) by mouth every 12 (twelve) hours. for 7 days  Dispense: 14 capsule; Refill: 0

## 2024-03-21 ENCOUNTER — ON-DEMAND VIRTUAL (OUTPATIENT)
Dept: URGENT CARE | Facility: CLINIC | Age: 29
End: 2024-03-21
Payer: MEDICAID

## 2024-03-21 DIAGNOSIS — T78.40XA ALLERGY, INITIAL ENCOUNTER: Primary | ICD-10-CM

## 2024-03-21 PROCEDURE — 99212 OFFICE O/P EST SF 10 MIN: CPT | Mod: 95,,, | Performed by: FAMILY MEDICINE

## 2024-03-21 RX ORDER — MONTELUKAST SODIUM 10 MG/1
10 TABLET ORAL NIGHTLY
Qty: 30 TABLET | Refills: 0 | Status: SHIPPED | OUTPATIENT
Start: 2024-03-21 | End: 2024-04-20

## 2024-03-21 NOTE — PROGRESS NOTES
Subjective:      Patient ID: Ethan Lares is a 28 y.o. female.    Vitals:  vitals were not taken for this visit.     Chief Complaint: Cough (allergies)      Visit Type: TELE AUDIOVISUAL    Present with the patient at the time of consultation: TELEMED PRESENT WITH PATIENT: None    Past Medical History:   Diagnosis Date    Anxiety      Past Surgical History:   Procedure Laterality Date    ANTERIOR VAGINAL REPAIR N/A     s/p delivery with vaginal disruption    DIAGNOSTIC LAPAROSCOPY N/A 5/10/2023    Procedure: LAPAROSCOPY, DIAGNOSTIC;  Surgeon: Regla Larios MD;  Location: [facility] MAIN OR;  Service: OB/GYN;  Laterality: N/A;    NASAL TURBINATE REDUCTION Bilateral 4/9/2021    Procedure: REDUCTION, NASAL TURBINATE;  Surgeon: Sujit Thomas MD;  Location: [facility] OR;  Service: ENT;  Laterality: Bilateral;    VAGINECTOMY N/A 5/10/2023    Procedure: VAGINECTOMY/vaginal cyst excision;  Surgeon: Regla Larios MD;  Location: [facility] MAIN OR;  Service: OB/GYN;  Laterality: N/A;     Review of patient's allergies indicates:   Allergen Reactions    Metronidazole Anxiety, Shortness Of Breath and Swelling    Shrimp Rash     No current outpatient medications on file prior to visit.     No current facility-administered medications on file prior to visit.     Family History   Problem Relation Age of Onset    No Known Problems Mother     No Known Problems Father        Medications Ordered                [facility] #[id number] [address] AT [facility] ([place] [address]) &   [address] [facility] [address]    Telephone: [phone]   Fax: [phone]   Hours: Not open 24 hours                         E-Prescribed (1 of 1)              montelukast (SINGULAIR) 10 mg tablet    Sig: Take 1 tablet (10 mg total) by mouth every evening.       Start: 3/21/24     Quantity: 30 tablet Refills: 0                           Ohs Peq Odvv Intake    3/21/2024  3:10 PM CDT  - Filed by Patient   What is your current physical [address] in the event of a medical emergency[facility] [address] [facility] [facility] [address] [address]   Are you able to take your vital signs? No   Please attach any relevant images or files          27 yo female with seasonal allergy symptoms for 1 week. Has taken benadryl.      Cough      HENT:  Positive for congestion.    Respiratory:  Positive for cough.         Objective:   The physical exam was conducted virtually.  Physical Exam   Pulmonary/Chest: Effort normal. No respiratory distress. She has no wheezes.   Neurological: She is alert.       Assessment:     1. Allergy, initial encounter        Plan:       Allergy, initial encounter  -     montelukast (SINGULAIR) 10 mg tablet; Take 1 tablet (10 mg total) by mouth every evening.  Dispense: 30 tablet; Refill: 0      Please take over the counter decongestants for your symptoms.

## 2025-02-18 ENCOUNTER — ON-DEMAND VIRTUAL (OUTPATIENT)
Dept: URGENT CARE | Facility: CLINIC | Age: 30
End: 2025-02-18
Payer: MEDICAID

## 2025-02-18 DIAGNOSIS — J06.9 UPPER RESPIRATORY TRACT INFECTION, UNSPECIFIED TYPE: Primary | ICD-10-CM

## 2025-02-18 DIAGNOSIS — R05.9 COUGH, UNSPECIFIED TYPE: ICD-10-CM

## 2025-02-18 RX ORDER — PROMETHAZINE HYDROCHLORIDE AND DEXTROMETHORPHAN HYDROBROMIDE 6.25; 15 MG/5ML; MG/5ML
5 SYRUP ORAL NIGHTLY PRN
Qty: 35 ML | Refills: 0 | Status: SHIPPED | OUTPATIENT
Start: 2025-02-18 | End: 2025-02-25

## 2025-02-18 RX ORDER — PREDNISONE 20 MG/1
20 TABLET ORAL DAILY
Qty: 5 TABLET | Refills: 0 | Status: SHIPPED | OUTPATIENT
Start: 2025-02-18 | End: 2025-02-23

## 2025-02-18 NOTE — PROGRESS NOTES
Subjective:      Patient ID: Ethan Lares is a 29 y.o. female.    Vitals:  vitals were not taken for this visit.     Chief Complaint: Sinus Problem      Visit Type: TELE AUDIOVISUAL    Patient Location: Home     Present with the patient at the time of consultation: TELEMED PRESENT WITH PATIENT: None    Past Medical History:   Diagnosis Date    Anxiety      Past Surgical History:   Procedure Laterality Date    ANTERIOR VAGINAL REPAIR N/A     s/p delivery with vaginal disruption    DIAGNOSTIC LAPAROSCOPY N/A 5/10/2023    Procedure: LAPAROSCOPY, DIAGNOSTIC;  Surgeon: Regla Larios MD;  Location: [facility] MAIN OR;  Service: OB/GYN;  Laterality: N/A;    NASAL TURBINATE REDUCTION Bilateral 4/9/2021    Procedure: REDUCTION, NASAL TURBINATE;  Surgeon: Sujit Thomas MD;  Location: [facility] OR;  Service: ENT;  Laterality: Bilateral;    VAGINECTOMY N/A 5/10/2023    Procedure: VAGINECTOMY/vaginal cyst excision;  Surgeon: Regla Larios MD;  Location: [facility] MAIN OR;  Service: OB/GYN;  Laterality: N/A;     Review of patient's allergies indicates:   Allergen Reactions    Metronidazole Anxiety, Shortness Of Breath and Swelling    Shrimp Rash     Medications Ordered Prior to Encounter[1]  Family History   Problem Relation Name Age of Onset    No Known Problems Mother      No Known Problems Father         Medications Ordered                [id number] [address] AT [address], [facility] [address]    Telephone: [phone]   Fax: [phone]   Hours: Not open 24 hours                         E-Prescribed (2 of 2)              predniSONE (DELTASONE) 20 MG tablet    Sig: Take 1 tablet (20 mg total) by mouth once daily. for 5 days       Start: 2/18/25     Quantity: 5 tablet Refills: 0                         promethazine-dextromethorphan (PROMETHAZINE-DM) 6.25-15 mg/5 mL Syrp    Sig: Take 5 mLs by mouth  nightly as needed (cough).       Start: 2/18/25     Quantity: 35 mL Refills: 0                           Ohs Peq Odvv Intake    2/18/2025 12:23 PM CST - Filed by Patient   What is your current physical address in the event of a medical emergency? [address]   Are you able to take your vital signs? No   Please attach any relevant images or files    Is your employer contracted with [facility]? No         5 days of sinus congestion. Seen in . Negative Flu/COVID. Given benzonatate with little relief. Not taking anything else at this time.    Sinus Problem  Associated symptoms include congestion, coughing and sinus pressure.       Constitution: Negative for fever.   HENT:  Positive for congestion, postnasal drip and sinus pressure.    Respiratory:  Positive for cough.         Objective:   The physical exam was conducted virtually.  Physical Exam   Constitutional: She is oriented to person, place, and time. She does not appear ill. No distress.   HENT:   Head: Normocephalic and atraumatic.   Nose: Nose normal.   Eyes: Extraocular movement intact   Pulmonary/Chest: Effort normal.   Abdominal: Normal appearance.   Musculoskeletal: Normal range of motion.         General: Normal range of motion.   Neurological: no focal deficit. She is alert and oriented to person, place, and time.   Psychiatric: Her behavior is normal. Mood normal.   Vitals reviewed.      Assessment:     1. Upper respiratory tract infection, unspecified type    2. Cough, unspecified type        Plan:   Patient encouraged to monitor symptoms closely and instructed to follow-up for new or worsening symptoms. Further, in-person, evaluation may be necessary for continued treatment. Please follow up with your primary care doctor or specialist as needed. Verbally discussed plan. Patient confirms understanding and is in agreement with treatment and plan.     You must understand that you've received a [facility] Care evaluation only and that  you may be released before all your medical problems are known or treated. You, the patient, will arrange for follow up care as instructed.      Upper respiratory tract infection, unspecified type  -     predniSONE (DELTASONE) 20 MG tablet; Take 1 tablet (20 mg total) by mouth once daily. for 5 days  Dispense: 5 tablet; Refill: 0    Cough, unspecified type  -     promethazine-dextromethorphan (PROMETHAZINE-DM) 6.25-15 mg/5 mL Syrp; Take 5 mLs by mouth nightly as needed (cough).  Dispense: 35 mL; Refill: 0             Patient Instructions   OVER THE COUNTER RECOMMENDATIONS/SUGGESTIONS (IF NO CONTRAINDICATIONS).     ·         Make sure to stay well hydrated.     ·         Use Nasal Saline to mechanically move any post nasal drip from your eustachian tube or from the back of your throat.     ·         Use warm saltwater gargles to ease your throat pain. Warm saltwater gargles as needed for sore throat-  1/2 tsp salt to 1 cup warm water, gargle as desired. Warm fluids tend to relieve a sore throat.     .         Throat lozenges, Chloraseptic spray or other over the counter treatments are ok to use as well. Use as directed.     ·         Use an antihistamine such as Claritin, Zyrtec or Allegra to dry you out.     ·         Use pseudoephedrine (behind the counter) to decongest. Pseudoephedrine  30 mg up to 240 mg /day. It can raise your blood pressure and give you palpitations.     ·         Use Mucinex (guaifenesin) to break up mucous up to 2400mg/day to loosen any mucous.     ·         The Mucinex DM pill has a cough suppressant that can be sedating. It can be used at night to stop the tickle at the back of your throat.     ·         You can use Mucinex D (it has guaifenesin and a high dose of pseudoephedrine) in the mornings to help decongest.     ·         Use Afrin (oxymetazoline) in each nare for no longer than 3 days, as it is addictive. It can also dry out your mucous membranes and cause elevated blood pressure.  This is especially useful if you are flying.     ·         Use Flonase 1-2 sprays/nostril per day. It is a local acting steroid nasal spray, if you develop a bloody nose, stop using the medication immediately.     ·         Sometimes Nyquil at night is beneficial to help you get some rest, however it is sedating, and it does have an antihistamine, and Tylenol.     ·         Honey is a natural cough suppressant that can be used.     ·         Tylenol up to 4,000 mg a day is safe for short periods and can be used for body aches, pain, and fever. However, in high doses and prolonged use it can cause liver irritation.     ·         Ibuprofen is a non-steroidal anti-inflammatory that can be used for body aches, pain, and fever. However, it can also cause stomach irritation if overused.                        [1]   Current Outpatient Medications on File Prior to Visit   Medication Sig Dispense Refill    busPIRone (BUSPAR) 5 MG Tab Take 1 tablet (5 mg total) by mouth 2 (two) times daily. 60 tablet 3     No current facility-administered medications on file prior to visit.